# Patient Record
Sex: FEMALE | URBAN - METROPOLITAN AREA
[De-identification: names, ages, dates, MRNs, and addresses within clinical notes are randomized per-mention and may not be internally consistent; named-entity substitution may affect disease eponyms.]

---

## 2023-01-04 ENCOUNTER — APPOINTMENT (RX ONLY)
Dept: URBAN - METROPOLITAN AREA CLINIC 12 | Facility: CLINIC | Age: 73
Setting detail: DERMATOLOGY
End: 2023-01-04

## 2023-01-04 DIAGNOSIS — Z41.9 ENCOUNTER FOR PROCEDURE FOR PURPOSES OTHER THAN REMEDYING HEALTH STATE, UNSPECIFIED: ICD-10-CM

## 2023-01-04 PROCEDURE — ? BOTOX

## 2023-01-04 PROCEDURE — ? OTHER (COSMETIC)

## 2023-01-04 NOTE — PROCEDURE: BOTOX
Glabellar Complex Units: 10
Dilution (U/0.1 Cc): 5
Reconstitution Date: 01/04/2023
Show Price In Note?: yes
Platsyma Units: 0
Price Per Unit In $ (Use Numbers Only, No Text Please.): 15
Additional Area 2 Location: chin
Additional Area 2 Units: 2.5
Periorbital Skin Units: 27.5
Detail Level: Simple
Expiration Date (Month Year): 02/25
Lot #: [id number]
Map Statment: See attached map for complete details
Bill Summary Price Listed Below, Or Bill Total Of Units X Price Per Unit?: Bill #Units x Price Per Unit
Additional Area 1 Location: upper lip
Additional Area 1 Units: 7.5
Consent: Written consent was obtained prior to the procedure. Risks, benefits, expectations and alternatives were discussed including, but not limited to, infection, bleeding, lid/brow ptosis, bruising, swelling, diplopia, temporary effects, incomplete chemical denervation and dissatisfaction with the cosmetic outcome. No guarantee or warranty was given or implied regarding longevity of results.
Postcare Instructions: Patient instructed to not lie down for 4 hours and limit physical activity for 24 hours. Patient instructed not to travel by airplane for 48 hours.
Administered By (Optional): Geni Chapa
Topical Anesthesia?: 23% lidocaine, 7% tetracaine
Additional Area 3 Location: medial platysmal band

## 2023-01-04 NOTE — PROCEDURE: OTHER (COSMETIC)
Other (Free Text): Diluted Defyne injection fee per Geni Chapa RN
Sticky Other (Free Text): Restylane Defyne (with 0.5 cc lidocaine)\\nLot: \\nExp: \\n0.1 cc to upper perioral lines
Price $ (Use Numbers Only, No Text Please.): 100
Detail Level: Simple

## 2023-01-31 ENCOUNTER — APPOINTMENT (RX ONLY)
Dept: URBAN - METROPOLITAN AREA CLINIC 12 | Facility: CLINIC | Age: 73
Setting detail: DERMATOLOGY
End: 2023-01-31

## 2023-01-31 DIAGNOSIS — Z41.1 ENCOUNTER FOR COSMETIC SURGERY: ICD-10-CM

## 2023-01-31 PROCEDURE — ? CONSULTATION - AGING FACE

## 2023-01-31 ASSESSMENT — LOCATION DETAILED DESCRIPTION DERM: LOCATION DETAILED: LEFT INFERIOR CENTRAL MALAR CHEEK

## 2023-01-31 ASSESSMENT — LOCATION SIMPLE DESCRIPTION DERM: LOCATION SIMPLE: LEFT CHEEK

## 2023-01-31 ASSESSMENT — LOCATION ZONE DERM: LOCATION ZONE: FACE

## 2023-01-31 NOTE — PROCEDURE: CONSULTATION - AGING FACE
Consultation Charge $ (Use Numbers Only, No Text Please.): 125.48
Send Procedure Quote As Charge: No
Detail Level: Detailed

## 2023-04-07 ENCOUNTER — APPOINTMENT (RX ONLY)
Dept: URBAN - METROPOLITAN AREA CLINIC 12 | Facility: CLINIC | Age: 73
Setting detail: DERMATOLOGY
End: 2023-04-07

## 2023-04-07 DIAGNOSIS — Z41.9 ENCOUNTER FOR PROCEDURE FOR PURPOSES OTHER THAN REMEDYING HEALTH STATE, UNSPECIFIED: ICD-10-CM

## 2023-04-07 PROCEDURE — ? BOTOX

## 2023-04-07 NOTE — PROCEDURE: BOTOX
Additional Area 3 Location: medial platysmal band
Forehead Units: 5
Lot #: [id number]
Perioral Units: 7.5
Expiration Date (Month Year): 04/2025
Glabellar Complex Units: 10
Topical Anesthesia?: 23% lidocaine, 7% tetracaine
Additional Area 1 Location: necklace lines
Additional Area 2 Location: chin
Consent: Written consent was obtained prior to the procedure. Risks, benefits, expectations and alternatives were discussed including, but not limited to, infection, bleeding, lid/brow ptosis, bruising, swelling, diplopia, temporary effects, incomplete chemical denervation and dissatisfaction with the cosmetic outcome. No guarantee or warranty was given or implied regarding longevity of results.
Nasal Root Units: 15
Bill Summary Price Listed Below, Or Bill Total Of Units X Price Per Unit?: Bill #Units x Price Per Unit
Administered By (Optional): Geni Chapa
Samson Units: 0
Additional Area 3 Units: 40
Detail Level: Simple
Postcare Instructions: Patient instructed to not lie down for 4 hours and limit physical activity for 24 hours. Patient instructed not to travel by airplane for 48 hours.
Use Map Statement For Sites (Optional): Yes
Additional Area 2 Units: 2.5
Map Statment: See attached map for complete details
Reconstitution Date: 04/07/2023
Periorbital Skin Units: 27.5